# Patient Record
Sex: FEMALE | Race: WHITE | NOT HISPANIC OR LATINO | Employment: FULL TIME | URBAN - METROPOLITAN AREA
[De-identification: names, ages, dates, MRNs, and addresses within clinical notes are randomized per-mention and may not be internally consistent; named-entity substitution may affect disease eponyms.]

---

## 2019-03-25 ENCOUNTER — OFFICE VISIT (OUTPATIENT)
Dept: INTERNAL MEDICINE CLINIC | Facility: CLINIC | Age: 39
End: 2019-03-25
Payer: COMMERCIAL

## 2019-03-25 VITALS
DIASTOLIC BLOOD PRESSURE: 90 MMHG | WEIGHT: 225 LBS | BODY MASS INDEX: 38.41 KG/M2 | SYSTOLIC BLOOD PRESSURE: 150 MMHG | TEMPERATURE: 98.7 F | HEIGHT: 64 IN

## 2019-03-25 DIAGNOSIS — R53.83 OTHER FATIGUE: ICD-10-CM

## 2019-03-25 DIAGNOSIS — J45.40 MODERATE PERSISTENT ASTHMA, UNSPECIFIED WHETHER COMPLICATED: ICD-10-CM

## 2019-03-25 DIAGNOSIS — Z00.00 ENCOUNTER FOR PREVENTATIVE ADULT HEALTH CARE EXAMINATION: Primary | ICD-10-CM

## 2019-03-25 DIAGNOSIS — Z12.4 SCREENING FOR CERVICAL CANCER: ICD-10-CM

## 2019-03-25 DIAGNOSIS — M54.41 CHRONIC BILATERAL LOW BACK PAIN WITH RIGHT-SIDED SCIATICA: ICD-10-CM

## 2019-03-25 DIAGNOSIS — Z13.220 SCREENING FOR HYPERLIPIDEMIA: ICD-10-CM

## 2019-03-25 DIAGNOSIS — R03.0 ELEVATED BP WITHOUT DIAGNOSIS OF HYPERTENSION: ICD-10-CM

## 2019-03-25 DIAGNOSIS — T78.40XA ALLERGIC DISORDER, INITIAL ENCOUNTER: ICD-10-CM

## 2019-03-25 DIAGNOSIS — Z13.1 SCREENING FOR DIABETES MELLITUS: ICD-10-CM

## 2019-03-25 DIAGNOSIS — G89.29 CHRONIC BILATERAL LOW BACK PAIN WITH RIGHT-SIDED SCIATICA: ICD-10-CM

## 2019-03-25 DIAGNOSIS — E55.9 VITAMIN D DEFICIENCY: ICD-10-CM

## 2019-03-25 PROBLEM — J45.909 ASTHMA: Status: ACTIVE | Noted: 2019-03-25

## 2019-03-25 PROCEDURE — 3008F BODY MASS INDEX DOCD: CPT | Performed by: NURSE PRACTITIONER

## 2019-03-25 PROCEDURE — 1036F TOBACCO NON-USER: CPT | Performed by: NURSE PRACTITIONER

## 2019-03-25 PROCEDURE — 99204 OFFICE O/P NEW MOD 45 MIN: CPT | Performed by: NURSE PRACTITIONER

## 2019-03-25 RX ORDER — EPINEPHRINE 0.3 MG/.3ML
0.3 INJECTION SUBCUTANEOUS ONCE
Qty: 0.6 ML | Refills: 0 | Status: SHIPPED | OUTPATIENT
Start: 2019-03-25 | End: 2019-03-25

## 2019-03-25 RX ORDER — ALBUTEROL SULFATE 90 UG/1
2 AEROSOL, METERED RESPIRATORY (INHALATION) EVERY 6 HOURS PRN
Qty: 1 INHALER | Refills: 5 | Status: SHIPPED | OUTPATIENT
Start: 2019-03-25

## 2019-03-25 NOTE — PROGRESS NOTES
Assessment/Plan:    No problem-specific Assessment & Plan notes found for this encounter  Diagnoses and all orders for this visit:    Encounter for preventative adult health care examination  Comments:  Completed today    Moderate persistent asthma, unspecified whether complicated  Comments:  Labwork ordered, Rx for Albuterol inhaler provided  Orders:  -     albuterol (PROVENTIL HFA,VENTOLIN HFA) 90 mcg/act inhaler; Inhale 2 puffs every 6 (six) hours as needed for wheezing or shortness of breath    Chronic bilateral low back pain with right-sided sciatica  Comments:  Accomodation form for employer [employer] completed regarding lifting restrictions at work  Orders:  -     Ambulatory referral to Physical Therapy; Future    Elevated BP without diagnosis of hypertension  Comments:  bp 150/90 2nd check 142/100  Orders:  -     UA w Reflex to Microscopic w Reflex to Culture    Allergic disorder, initial encounter  Comments:  Pt allergic to bee stings Rx for epi pen provided  Orders:  -     [place] Allergy Panel, Adult; Future  -     Food Specific IgG Allergy (Adult) Panel; Future  -     EPINEPHrine (EPIPEN) 0 3 mg/0 3 mL SOAJ; Inject 0 3 mL (0 3 mg total) into a muscle once for 1 dose  -     naphazoline-pheniramine (NAPHCON-A) 0 025-0 3 % ophthalmic solution; Administer 2 drops to both eyes 4 (four) times a day as needed for irritation    Screening for cervical cancer  Comments:  Referred to GYN  Orders:  -     Ambulatory referral to Gynecology;  Future    Screening for hyperlipidemia  Comments:  Labwork ordered  Orders:  -     Lipid Panel with Direct LDL reflex    Screening for diabetes mellitus  Comments:  Labwork ordered  Orders:  -     Comprehensive metabolic panel    Other fatigue  Comments:  Labwork ordered  Orders:  -     CBC and differential  -     TSH, 3rd generation with Free T4 reflex    Vitamin D deficiency  Comments:  Labwork ordered  Orders:  -     Vitamin D 25 hydroxy          Subjective:      Patient ID: Josue Chambers is a 41 y o  female  41 yr old female here to [facility]  Pt reports she has extensive hx of low back pain with disc surgery, bilateral wrist surgery , eye surgery and right knee surgery due to ongoing joint problems  Pt recently suffering from recurrent back pain that is aggravated by lifting, turning and bending at her job at [employer]  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 which is causing her so much discomfort pain is 10/10  With hx of low back surgery I am writing her a work restriction in addition to physical therapy referral  N/V intact  Pt has also recently been suffering from allergy induced asthma flare  Pt had several weeks of difficulty breathing was seen at [facility] and was prescribed steroids and does have an inhaler  I am ordering lab including allergy testing and bring pt back for lab review  Bp elevated and pt will check at home and return with her findings  The following portions of the patient's history were reviewed and updated as appropriate: She  has a past medical history of Allergic and Asthma  She   Patient Active Problem List    Diagnosis Date Noted    Allergic 03/25/2019    Asthma 03/25/2019     She  has a past surgical history that includes Wrist surgery; Knee surgery; [place] tooth extraction; and Spine surgery  Her family history includes Diabetes in her father, paternal grandfather, and paternal grandmother; Heart attack in her maternal grandmother and paternal grandmother; No Known Problems in her mother; Parkinsonism in her maternal grandfather  She  reports that she has never smoked  She has never used smokeless tobacco  She reports that she drank alcohol  She reports that she has current or past drug history    Current Outpatient Medications   Medication Sig Dispense Refill    albuterol (PROVENTIL HFA,VENTOLIN HFA) 90 mcg/act inhaler Inhale 2 puffs every 6 (six) hours as needed for wheezing or shortness of breath 1 Inhaler 5    EPINEPHrine (EPIPEN) 0 3 mg/0 3 mL SOAJ Inject 0 3 mL (0 3 mg total) into a muscle once for 1 dose 0 6 mL 0    naphazoline-pheniramine (NAPHCON-A) 0 025-0 3 % ophthalmic solution Administer 2 drops to both eyes 4 (four) times a day as needed for irritation 15 mL 5     No current facility-administered medications for this visit  No current outpatient medications on file prior to visit  No current facility-administered medications on file prior to visit  She is allergic to other       Review of Systems   Constitutional: Positive for activity change  HENT: Negative  Eyes: Negative  Respiratory: Negative  Cardiovascular: Negative  Gastrointestinal: Negative  Endocrine: Negative  Genitourinary: Negative  Musculoskeletal: Positive for arthralgias, back pain and gait problem  Skin: Negative  Allergic/Immunologic: Negative  Hematological: Negative  Psychiatric/Behavioral: Negative  Objective:      /90   Temp 98 7 °F (37 1 °C)   Ht 5' 4" (1 626 m)   Wt 102 kg (225 lb)   BMI 38 62 kg/m²          Physical Exam   Constitutional: She is oriented to person, place, and time  She appears well-developed and well-nourished  She appears distressed  HENT:   Head: Normocephalic  Eyes: Pupils are equal, round, and reactive to light  Neck: Normal range of motion  Cardiovascular: Normal rate and regular rhythm  Pulmonary/Chest: Effort normal and breath sounds normal    Abdominal: Soft  Bowel sounds are normal    Musculoskeletal:        Lumbar back: She exhibits decreased range of motion, tenderness and pain  Neurological: She is alert and oriented to person, place, and time  Skin: Skin is warm and dry  Psychiatric: Her behavior is normal  Judgment and thought content normal  Her mood appears anxious  Nursing note and vitals reviewed

## 2020-02-22 PROBLEM — R03.0 ELEVATED BLOOD-PRESSURE READING WITHOUT DIAGNOSIS OF HYPERTENSION: Status: ACTIVE | Noted: 2018-02-15

## 2020-02-22 PROBLEM — J30.9 ALLERGIC RHINITIS: Status: ACTIVE | Noted: 2018-02-15

## 2020-02-22 PROBLEM — M54.50 LOW BACK PAIN: Status: ACTIVE | Noted: 2018-02-15
